# Patient Record
Sex: MALE | Race: OTHER | NOT HISPANIC OR LATINO
[De-identification: names, ages, dates, MRNs, and addresses within clinical notes are randomized per-mention and may not be internally consistent; named-entity substitution may affect disease eponyms.]

---

## 2022-05-17 ENCOUNTER — TRANSCRIPTION ENCOUNTER (OUTPATIENT)
Age: 77
End: 2022-05-17

## 2023-02-22 ENCOUNTER — APPOINTMENT (OUTPATIENT)
Dept: ORTHOPEDIC SURGERY | Facility: CLINIC | Age: 78
End: 2023-02-22
Payer: MEDICARE

## 2023-02-22 DIAGNOSIS — M75.81 OTHER SHOULDER LESIONS, RIGHT SHOULDER: ICD-10-CM

## 2023-02-22 PROCEDURE — 73030 X-RAY EXAM OF SHOULDER: CPT | Mod: RT

## 2023-02-22 PROCEDURE — 99204 OFFICE O/P NEW MOD 45 MIN: CPT

## 2023-02-22 RX ORDER — MELOXICAM 15 MG/1
15 TABLET ORAL DAILY
Qty: 30 | Refills: 1 | Status: ACTIVE | COMMUNITY
Start: 2023-02-22 | End: 1900-01-01

## 2023-02-22 NOTE — HISTORY OF PRESENT ILLNESS
[de-identified] : (New Visit)\par \par Patient Details\par -Age: 77\par -Occupation: retired \par -Worker’s compensation claim: no\par -No-fault claim: no\par -School injury claim: no\par \par \par Symptom Details \par -Body part: RT shoulder \par -Duration of symptoms: 1 month\par -How symptoms began:  NKI.\par -Location of pain: Lateral \par -Quality of pain: dull\par -Pain score at rest: 4/10\par -Pain score with activity: 6/10\par -Swelling: no\par -Stiffness: no\par -Radicular symptoms (numbness or radiating pain down extremity): no\par \par \par Treatment Details\par -Activity modification: no\par -Medication: naproxen prn\par -Physical therapy: no\par -Home exercise program:no \par -Injection: no \par -MRI: no\par \par

## 2023-02-22 NOTE — IMAGING
[de-identified] : (Shoulder Examination)\par \par Laterality\par -Right\par \par General\par -In no acute distress: yes\par -Alert and oriented to person, place and time: yes\par -Lymphadenopathy: no\par -Peripheral edema: no\par -Sensation grossly intact to light touch: yes\par -Capillary refill less than 2 seconds: yes \par \par Inspection\par -Skin intact: yes\par -Swelling: no\par -Atrophy: no\par -Scapular winging: no\par -AC deformity: no\par -Biceps deformity: no\par \par Tenderness to Palpation\par -Bicipital groove: no \par -AC joint: no\par -Scapulothoracic: no\par -Cervical paraspinal: no\par \par Range of Motion\par -Active forward elevation: 175\par -Passive forward elevation: 175\par -External rotation at the side: 80\par -Internal rotation behind the back: T7 \par -Cervical spine: normal\par \par Strength \par -Forward elevation: 5\par -External rotation at the side: 5\par -Internal rotation at the side: 5\par -Deltoid: 5\par \par Special Tests\par -Conner: positive \par -O’Franco’s: negative\par -Speed’s: negative\par -Cross body adduction: negative\par -Apprehension and relocation: negative\par -Sulcus sign: negative\par -Belly press: negative\par -Spurling’s: negative\par \par  [Right] : right shoulder [There are no fractures, subluxations or dislocations. No significant abnormalities are seen] : There are no fractures, subluxations or dislocations. No significant abnormalities are seen [Type 3 acromion] : Type 3 acromion [AC Joint Arthrosis] : AC Joint Arthrosis [FreeTextEntry1] : minimal GH OA, sclerosis of the greater tuberosity

## 2023-02-22 NOTE — DISCUSSION/SUMMARY
[de-identified] : (Assessment)\par \par History, clinical examination and imaging were most consistent with:\par -right rotator cuff tendonitis\par \par The diagnosis was explained in detail. The potential non-surgical and surgical treatments were reviewed. The relative risks and benefits of each option were considered relative to the patient’s age, activity level, medical history, symptom severity and previously attempted treatments. \par \par -Non-surgical treatment was selected. The added clinical utility of an MRI was discussed. The patient deferred further diagnostic testing at this time to pursue non-surgical treatment.\par -Cortisone injection was disucssed and deferred. Patient reports that prior injection caused hiccups and he would need an anti-emetic at the time of injection.\par -If patient fails to improve we would consider CSI versus MRI at his next visit. \par \par (Plan)\par \par -Activity modification\par -Physical therapy: script provided\par -Meloxicam: once daily with food, GI precautions discussed\par -Follow up: 6 weeks if failing to improve\par \par \par (MDM) \par \par Problem Complexity\par -Moderate: chronic illness with exacerbation\par \par Risk\par -Low: physical therapy\par -Moderate: prescription medication\par \par -Patient has not been seen by another provider in my practice within the past 2 years who specializes in orthopedic sports medicine, shoulder or elbow surgery. \par \par The patient was advised to consult with their primary medical provider prior to initiation of any new medications to reduce the risk of adverse effects specific to their long-term home medications and medical history. The risk of gastrointestinal irritation and kidney injury specific to long-term NSAID use was discussed.   \par \par

## 2023-02-28 ENCOUNTER — APPOINTMENT (OUTPATIENT)
Dept: ORTHOPEDIC SURGERY | Facility: CLINIC | Age: 78
End: 2023-02-28

## 2023-03-07 ENCOUNTER — APPOINTMENT (OUTPATIENT)
Dept: ORTHOPEDIC SURGERY | Facility: CLINIC | Age: 78
End: 2023-03-07
Payer: MEDICARE

## 2023-03-07 DIAGNOSIS — Z96.653 PRESENCE OF ARTIFICIAL KNEE JOINT, BILATERAL: ICD-10-CM

## 2023-03-07 PROCEDURE — 73562 X-RAY EXAM OF KNEE 3: CPT | Mod: 50

## 2023-03-07 PROCEDURE — 99213 OFFICE O/P EST LOW 20 MIN: CPT

## 2023-03-07 NOTE — ASSESSMENT
[FreeTextEntry1] : 78 yo male presenting s/p bilateral TKAs with Dr. Johnson in 2018. Patient without pain but reports stiffness. X-rays normal. Recommend non-surgical management\par -Discussed with patient that he should perform HEP\par -Activities as tolerated\par -All questions answered\par -F/u PRN\par

## 2023-03-07 NOTE — HISTORY OF PRESENT ILLNESS
[Sudden] : sudden [0] : 0 [Intermittent] : intermittent [Rest] : rest [Retired] : Work status: retired [de-identified] : Patient is here for bilat knees. Patient had bilat TKA with Dr. Johnson in 2018. Patient states NKI. Patient states he started to have stiffness in the past couple of months. Patient states he does not have any pain.  [] : Post Surgical Visit: no [FreeTextEntry1] : Bilat knees [FreeTextEntry3] : a couple of months ago  [FreeTextEntry5] : Patient states NKI

## 2023-03-07 NOTE — PHYSICAL EXAM
[de-identified] : Examination of the right and left knees is as follows:\par INSPECTION: well healed anterior knee scar that is clean/dry/intact, no ecchymosis, no erythema, no atrophy, no deformities of quad tendon or of patellar tendon\par PALPATION: non tender palpable right medial patellar osteophyte,, no palpable mass, no obvious defects, no increased warmth, no calf tenderness\par ROM: flexion 130 degrees, but extension 0 degrees\par STRENGTH: quadriceps 5/5, hamstring 5/5\par TESTING: ligamentously stable\par NEURO: light touch is intact throughout\par GAIT: non-antalgic, patient ambulates without assistive device\par \par X-rays of the right and left knees is as follows: \par Knee 3 view in the anteroposterior, lateral, skyline views: s/p TKA, right patella with medial osteophyte, hardware in proper position and alignment without failure

## 2023-04-03 ENCOUNTER — APPOINTMENT (OUTPATIENT)
Dept: ORTHOPEDIC SURGERY | Facility: CLINIC | Age: 78
End: 2023-04-03
